# Patient Record
Sex: FEMALE | Race: ASIAN | URBAN - METROPOLITAN AREA
[De-identification: names, ages, dates, MRNs, and addresses within clinical notes are randomized per-mention and may not be internally consistent; named-entity substitution may affect disease eponyms.]

---

## 2017-05-16 ENCOUNTER — PRE VISIT (OUTPATIENT)
Dept: OTOLARYNGOLOGY | Facility: CLINIC | Age: 32
End: 2017-05-16

## 2017-05-16 NOTE — TELEPHONE ENCOUNTER
1.  Date/reason for appt:  5/25/17   Rhinitis    2.  Referring provider:  Self    3.  Call to patient (Yes / No - short description):  Yes, left [facility] for pt to return call.    Where's she been seen/when; what's she had done; any related surgery.    Need email to send CARMELINA (s)

## 2017-05-25 ENCOUNTER — OFFICE VISIT (OUTPATIENT)
Dept: OTOLARYNGOLOGY | Facility: CLINIC | Age: 32
End: 2017-05-25

## 2017-05-25 ENCOUNTER — OFFICE VISIT (OUTPATIENT)
Dept: DERMATOLOGY | Facility: CLINIC | Age: 32
End: 2017-05-25

## 2017-05-25 VITALS — WEIGHT: 128 LBS | BODY MASS INDEX: 23.55 KG/M2 | HEIGHT: 62 IN

## 2017-05-25 DIAGNOSIS — B35.3 TINEA PEDIS, UNSPECIFIED LATERALITY: ICD-10-CM

## 2017-05-25 DIAGNOSIS — B35.1 ONYCHOMYCOSIS: ICD-10-CM

## 2017-05-25 DIAGNOSIS — Z79.899 ENCOUNTER FOR LONG-TERM (CURRENT) USE OF HIGH-RISK MEDICATION: Primary | ICD-10-CM

## 2017-05-25 DIAGNOSIS — J31.0 CHRONIC RHINITIS: Primary | ICD-10-CM

## 2017-05-25 LAB
ALBUMIN SERPL-MCNC: 4.3 G/DL (ref 3.4–5)
ALP SERPL-CCNC: 48 U/L (ref 40–150)
ALT SERPL W P-5'-P-CCNC: 18 U/L (ref 0–50)
AST SERPL W P-5'-P-CCNC: 15 U/L (ref 0–45)
BILIRUB DIRECT SERPL-MCNC: 0.1 MG/DL (ref 0–0.2)
BILIRUB SERPL-MCNC: 0.4 MG/DL (ref 0.2–1.3)
PROT SERPL-MCNC: 7.9 G/DL (ref 6.8–8.8)

## 2017-05-25 RX ORDER — ECONAZOLE NITRATE 10 MG/G
CREAM TOPICAL
Qty: 85 G | Refills: 3 | Status: SHIPPED | OUTPATIENT
Start: 2017-05-25

## 2017-05-25 RX ORDER — FLUTICASONE PROPIONATE 50 MCG
2 SPRAY, SUSPENSION (ML) NASAL DAILY
Qty: 16 G | Refills: 1 | Status: SHIPPED | OUTPATIENT
Start: 2017-05-25 | End: 2017-06-24

## 2017-05-25 RX ORDER — LORATADINE 10 MG/1
10 CAPSULE, LIQUID FILLED ORAL DAILY
Qty: 60 CAPSULE | Refills: 3 | Status: SHIPPED | OUTPATIENT
Start: 2017-05-25 | End: 2017-07-24

## 2017-05-25 ASSESSMENT — PAIN SCALES - GENERAL
PAINLEVEL: NO PAIN (0)
PAINLEVEL: NO PAIN (0)

## 2017-05-25 NOTE — NURSING NOTE
"Dermatology Rooming Note    Bhargavi Ayala's goals for this visit include:   Chief Complaint   Patient presents with     Derm Problem     Bhargavi is here today for concerns of having tinea pedis. States she has had it for \"many years.\"         Cheri Olsen LPN  "

## 2017-05-25 NOTE — LETTER
5/25/2017       RE: Bhargavi Ayala  [address] [facility] [address]     Dear Colleague,    Thank you for referring your patient, Bhargavi Ayala, to the [facility] DERMATOLOGY at [facility]. Please see a copy of my visit note below.    CHIEF COMPLAINT:  Skin changes on feet.      HISTORY OF PRESENT ILLNESS:  Bhargavi is a very pleasant 32-year-old Chinese female with a several-year history of scaling on the feet that she has previously been told is foot fungus.  She has tried some Chinese medicines for this before but has never received a prescription and is not using any over-the-counter antifungals.  She also notes thickening and yellowing of multiple toenails.  She has no similar involvement of her skin at other sites and no involvement of her fingernails.      REVIEW OF SYSTEMS:  No recent fevers or chills.  No nausea, vomiting, diarrhea or abdominal pain.      SOCIAL HISTORY:  She denies consuming alcohol.      PHYSICAL EXAMINATION:   GENERAL:  This is a well-appearing, well-nourished female with a normal mood and affect who is oriented x3.   SKIN:  A cutaneous exam of the head, neck, bilateral upper and lower extremities was performed.  On the bilateral plantar feet, there is fine branny scaling in a moccasin distribution, but no significant erythema or crusting.  Multiple toenails demonstrate yellowing, thickening and general onychodystrophy.      IN-OFFICE EXAM:  KOH exam of plantar foot scale was negative for fungal elements.      ASSESSMENT AND PLAN:  Likely onychomycosis and possible tinea pedis.  While her KOH scraping was negative, her nail involvement is highly suspicious for a dermatophyte infection.  For this reason, we gave her a prescription for econazole 1% cream to be applied twice daily, also we took a nail clipping for PAS staining and performed screening LFTs.  If her nail clipping is positive and LFTs are within normal range, we will have her start terbinafine 250  mg once daily for a total of 90 days.  I counseled her regarding potential side effects including abdominal pain and the rare risks of hepatic toxicity.  She will subsequently follow up in our clinic in approximately 4 months' time.       Abhilash Myers MD  Dermatology Attending

## 2017-05-25 NOTE — PROGRESS NOTES
The patient presents with a history of chronic non-purulent rhinitis with exacerbations in the Spring and when exposed to strong scents. She reports that her symptoms worsen when she lays down in bed. The patient denies sinusitis, facial pain, nasal obstruction or purulent nasal discharge. The patient denies chronic or recurrent tonsillitis, chronic or recurrent pharyngitis. The patient denies otalgia, otorrhea, eustachian tube dysfunction, ear infections, dizziness or tinnitus.     This patient is seen in consultation as a self referral to my clinic.     All other systems were reviewed and they are either negative or they are not directly pertinent to this Otolaryngology examination.      Past Medical History:    Past Medical History:   Diagnosis Date     Allergic rhinitis 2010       Past Surgical History:    No past surgical history on file.    Medications:    No current outpatient prescriptions on file.    Allergies:    Pollen extract    Physical Examination:    The patient is a well developed, well nourished female in no apparent distress.  She is normocephalic, atraumatic with pupils equally round and reactive to light.    Oral Cavity Examination:  Normal mucosa with no masses or lesions  Nasal Examination: Congested nasal turbinates and nasal mucosa with no masses or lesions  Ear Examination: Ear canals clear, tympanic membranes and middle ear spaces normal  Neurological Examination: Facial nerve function intact and symmetric  Integumentary Examination: No lesions on the skin of the head and neck  Neck Examination: No masses or lesions, no lymphadenopathy  Endocrine Examination: Normal thyroid examination    Assessment and Plan:    The patient presents with a history of allergic rhinitis. She will be referred to Dr. Dallas Alvarez for further evaluation and allergy testing. She will be initiated on Flonase Nasal Spray and Claritin and she will stop these medications two weeks prior to her visit with   Dallas lAvarez.

## 2017-05-25 NOTE — LETTER
5/25/2017       RE: Bhargavi Ayala  [address] [facility] [address]     Dear Colleague,    Thank you for referring your patient, Bhargavi Ayala, to the [facility] EAR NOSE AND THROAT at [facility]. Please see a copy of my visit note below.    The patient presents with a history of chronic non-purulent rhinitis with exacerbations in the Spring and when exposed to strong scents. She reports that her symptoms worsen when she lays down in bed. The patient denies sinusitis, facial pain, nasal obstruction or purulent nasal discharge. The patient denies chronic or recurrent tonsillitis, chronic or recurrent pharyngitis. The patient denies otalgia, otorrhea, eustachian tube dysfunction, ear infections, dizziness or tinnitus.     This patient is seen in consultation as a self referral to my clinic.     All other systems were reviewed and they are either negative or they are not directly pertinent to this Otolaryngology examination.      Past Medical History:    Past Medical History:   Diagnosis Date     Allergic rhinitis 2010       Past Surgical History:    No past surgical history on file.    Medications:    No current outpatient prescriptions on file.    Allergies:    Pollen extract    Physical Examination:    The patient is a well developed, well nourished female in no apparent distress.  She is normocephalic, atraumatic with pupils equally round and reactive to light.    Oral Cavity Examination:  Normal mucosa with no masses or lesions  Nasal Examination: Congested nasal turbinates and nasal mucosa with no masses or lesions  Ear Examination: Ear canals clear, tympanic membranes and middle ear spaces normal  Neurological Examination: Facial nerve function intact and symmetric  Integumentary Examination: No lesions on the skin of the head and neck  Neck Examination: No masses or lesions, no lymphadenopathy  Endocrine Examination: Normal thyroid examination    Assessment and Plan:    The  patient presents with a history of allergic rhinitis. She will be referred to Dr. Dallas Alvarez for further evaluation and allergy testing. She will be initiated on Flonase Nasal Spray and Claritin and she will stop these medications two weeks prior to her visit with Dr. Dallas Alvarez.         Again, thank you for allowing me to participate in the care of your patient.      Sincerely,    Inocencio Salinas MD

## 2017-05-25 NOTE — MR AVS SNAPSHOT
After Visit Summary   5/25/2017    Bhargavi Ayala    MRN: [id number]           Patient Information     Date Of Birth          1985        Visit Information        Provider Department      5/25/2017 4:00 PM Inocencio Salinas MD [facility] Ear Nose and Throat        Today's Diagnoses     Chronic rhinitis    -  1      Care Instructions    The patient presents with a history of allergic rhinitis. She will be referred to Dr. Dallas Alvarez for further evaluation and allergy testing. She will be initiated on Flonase Nasal Spray and Claritin and she will stop these medications two weeks prior to her visit with Dr. Dallas Alvarez.           Follow-ups after your visit        Who to contact     Please call your clinic at [phone] to:    Ask questions about your health    Make or cancel appointments    Discuss your medicines    Learn about your test results    Speak to your doctor   If you have compliments or concerns about an experience at your clinic, or if you wish to file a complaint, please contact [facility] Patient Relations at [phone] or email us at [email or web address]         Additional Information About Your Visit        MyChart Information     Visualaset gives you secure access to your electronic health record. If you see a primary care provider, you can also send messages to your care team and make appointments. If you have questions, please call your primary care clinic.  If you do not have a primary care provider, please call [phone] and they will assist you.      Goodpatch is an electronic gateway that provides easy, online access to your medical records. With Goodpatch, you can request a clinic appointment, read your test results, renew a prescription or communicate with your care team.     To access your existing account, please contact your [facility] or call [phone] for assistance.        Care EveryWhere  "ID     This is your Care EveryWhere ID. This could be used by other organizations to access your [place] medical records  [id number]        Your Vitals Were     Height BMI (Body Mass Index)                1.58 m (5' 2.21\") 23.26 kg/m2           Blood Pressure from Last 3 Encounters:   No data found for BP    Weight from Last 3 Encounters:   05/25/17 58.1 kg (128 lb)              We Performed the Following     OFFICE CONSULTATION,LEVEL III          Today's Medication Changes          These changes are accurate as of: 5/25/17  4:16 PM.  If you have any questions, ask your nurse or doctor.               Start taking these medicines.        Dose/Directions    fluticasone 50 MCG/ACT spray   Commonly known as:  FLONASE ALLERGY RELIEF   Used for:  Chronic rhinitis   Started by:  Inocencio Salinas MD        Dose:  2 spray   Spray 2 sprays into both nostrils daily   Quantity:  16 g   Refills:  1       loratadine 10 MG capsule   Commonly known as:  CLARITIN   Used for:  Chronic rhinitis   Started by:  Inocencio Salinas MD        Dose:  10 mg   Take 10 mg by mouth daily   Quantity:  60 capsule   Refills:  3            Where to get your medicines      These medications were sent to [facility] [address] [place] [facility] [address] [place] [address]  [address] [place] [address]     Phone:  [phone]     fluticasone 50 MCG/ACT spray    loratadine 10 MG capsule                Primary Care Provider    No Pcp Confirmed       No address on file        Thank you!     Thank you for choosing [facility] EAR NOSE AND THROAT  for your care. Our goal is always to provide you with excellent care. Hearing back from our patients is one way we can continue to improve our services. Please take a few minutes to complete the written survey that you may receive in the mail after your visit with us. Thank you!             Your Updated Medication List - Protect others around you: Learn how to safely use, store and throw away " your medicines at www.disposemymeds.org.          This list is accurate as of: 5/25/17  4:16 PM.  Always use your most recent med list.                   Brand Name Dispense Instructions for use    fluticasone 50 MCG/ACT spray    FLONASE ALLERGY RELIEF    16 g    Spray 2 sprays into both nostrils daily       loratadine 10 MG capsule    CLARITIN    60 capsule    Take 10 mg by mouth daily

## 2017-05-25 NOTE — MR AVS SNAPSHOT
After Visit Summary   5/25/2017    Bhargavi Ayala    MRN: [id number]           Patient Information     Date Of Birth          1985        Visit Information        Provider Department      5/25/2017 3:00 PM Abhilash Myers MD [facility] Dermatology        Today's Diagnoses     Encounter for long-term (current) use of high-risk medication    -  1    Onychomycosis        Tinea pedis, unspecified laterality           Follow-ups after your visit        Who to contact     Please call your clinic at [phone] to:    Ask questions about your health    Make or cancel appointments    Discuss your medicines    Learn about your test results    Speak to your doctor   If you have compliments or concerns about an experience at your clinic, or if you wish to file a complaint, please contact [facility] Patient Relations at [phone] or email us at [email or web address]         Additional Information About Your Visit        MyChart Information     PubNativet gives you secure access to your electronic health record. If you see a primary care provider, you can also send messages to your care team and make appointments. If you have questions, please call your primary care clinic.  If you do not have a primary care provider, please call [phone] and they will assist you.      Weeve is an electronic gateway that provides easy, online access to your medical records. With Weeve, you can request a clinic appointment, read your test results, renew a prescription or communicate with your care team.     To access your existing account, please contact your [facility] Clinic or call [phone] for assistance.        Care EveryWhere ID     This is your Care EveryWhere ID. This could be used by other organizations to access your [place] medical records  [id number]         Blood Pressure from Last 3 Encounters:   No data found for BP    Weight from Last 3  Encounters:   05/25/17 58.1 kg (128 lb)              We Performed the Following     Hepatic panel     [place] prep (Back Office)     Surgical pathology exam          Today's Medication Changes          These changes are accurate as of: 5/25/17 11:59 PM.  If you have any questions, ask your nurse or doctor.               Start taking these medicines.        Dose/Directions    econazole nitrate 1 % cream   Used for:  Tinea pedis, unspecified laterality   Started by:  Abhilash Myers MD        Apply twice a day to skin on feet   Quantity:  85 g   Refills:  3       fluticasone 50 MCG/ACT spray   Commonly known as:  FLONASE ALLERGY RELIEF   Used for:  Chronic rhinitis   Started by:  Inocencio Salinas MD        Dose:  2 spray   Spray 2 sprays into both nostrils daily   Quantity:  16 g   Refills:  1       loratadine 10 MG capsule   Commonly known as:  CLARITIN   Used for:  Chronic rhinitis   Started by:  Inocencio Salinas MD        Dose:  10 mg   Take 10 mg by mouth daily   Quantity:  60 capsule   Refills:  3       terbinafine 250 MG tablet   Commonly known as:  lamISIL   Used for:  Onychomycosis   Started by:  Abhilash Myers MD        Dose:  250 mg   Take 1 tablet (250 mg) by mouth daily For 90 days   Quantity:  30 tablet   Refills:  2            Where to get your medicines      These medications were sent to [place] [address] [place] [address]  [address] [place] [address]     Phone:  [phone]     econazole nitrate 1 % cream    fluticasone 50 MCG/ACT spray    loratadine 10 MG capsule    terbinafine 250 MG tablet                Primary Care Provider    No Pcp Confirmed       No address on file        Thank you!     Thank you for choosing [facility] DERMATOLOGY  for your care. Our goal is always to provide you with excellent care. Hearing back from our patients is one way we can continue to improve our services. Please take a few minutes to complete the written survey  that you may receive in the mail after your visit with us. Thank you!             Your Updated Medication List - Protect others around you: Learn how to safely use, store and throw away your medicines at www.disposemymeds.org.          This list is accurate as of: 5/25/17 11:59 PM.  Always use your most recent med list.                   Brand Name Dispense Instructions for use    econazole nitrate 1 % cream     85 g    Apply twice a day to skin on feet       fluticasone 50 MCG/ACT spray    FLONASE ALLERGY RELIEF    16 g    Spray 2 sprays into both nostrils daily       loratadine 10 MG capsule    CLARITIN    60 capsule    Take 10 mg by mouth daily       terbinafine 250 MG tablet    lamISIL    30 tablet    Take 1 tablet (250 mg) by mouth daily For 90 days

## 2017-05-25 NOTE — PATIENT INSTRUCTIONS
The patient presents with a history of allergic rhinitis. She will be referred to Dr. Dallas Alvarez for further evaluation and allergy testing. She will be initiated on Flonase Nasal Spray and Claritin and she will stop these medications two weeks prior to her visit with Dr. Dallas Alvarez.

## 2017-05-31 LAB — COPATH REPORT: NORMAL

## 2017-06-01 RX ORDER — TERBINAFINE HYDROCHLORIDE 250 MG/1
250 TABLET ORAL DAILY
Qty: 30 TABLET | Refills: 2 | Status: SHIPPED | OUTPATIENT
Start: 2017-06-01

## 2017-06-01 NOTE — PROGRESS NOTES
CHIEF COMPLAINT:  Skin changes on feet.      HISTORY OF PRESENT ILLNESS:  Bhargavi is a very pleasant 32-year-old Chinese female with a several-year history of scaling on the feet that she has previously been told is foot fungus.  She has tried some Chinese medicines for this before but has never received a prescription and is not using any over-the-counter antifungals.  She also notes thickening and yellowing of multiple toenails.  She has no similar involvement of her skin at other sites and no involvement of her fingernails.      REVIEW OF SYSTEMS:  No recent fevers or chills.  No nausea, vomiting, diarrhea or abdominal pain.      SOCIAL HISTORY:  She denies consuming alcohol.      PHYSICAL EXAMINATION:   GENERAL:  This is a well-appearing, well-nourished female with a normal mood and affect who is oriented x3.   SKIN:  A cutaneous exam of the head, neck, bilateral upper and lower extremities was performed.  On the bilateral plantar feet, there is fine branny scaling in a moccasin distribution, but no significant erythema or crusting.  Multiple toenails demonstrate yellowing, thickening and general onychodystrophy.      IN-OFFICE EXAM:  KOH exam of plantar foot scale was negative for fungal elements.      ASSESSMENT AND PLAN:  Likely onychomycosis and possible tinea pedis.  While her KOH scraping was negative, her nail involvement is highly suspicious for a dermatophyte infection.  For this reason, we gave her a prescription for econazole 1% cream to be applied twice daily, also we took a nail clipping for PAS staining and performed screening LFTs.  If her nail clipping is positive and LFTs are within normal range, we will have her start terbinafine 250 mg once daily for a total of 90 days.  I counseled her regarding potential side effects including abdominal pain and the rare risks of hepatic toxicity.  She will subsequently follow up in our clinic in approximately 4 months' time.       Abhilash Myers,  MD  Dermatology Attending

## 2017-06-13 ENCOUNTER — TRANSFERRED RECORDS (OUTPATIENT)
Dept: HEALTH INFORMATION MANAGEMENT | Facility: CLINIC | Age: 32
End: 2017-06-13

## 2017-06-15 PROBLEM — B35.1 ONYCHOMYCOSIS: Status: ACTIVE | Noted: 2017-06-15

## 2017-09-10 DIAGNOSIS — B35.1 ONYCHOMYCOSIS: ICD-10-CM

## 2017-09-11 NOTE — TELEPHONE ENCOUNTER
Last seen 5/25/17.  No future appt at this time.  (Should have completed 90 day dose)    ASSESSMENT AND PLAN:  Likely onychomycosis and possible tinea pedis.  While her KOH scraping was negative, her nail involvement is highly suspicious for a dermatophyte infection.  For this reason, we gave her a prescription for econazole 1% cream to be applied twice daily, also we took a nail clipping for PAS staining and performed screening LFTs.  If her nail clipping is positive and LFTs are within normal range, we will have her start terbinafine 250 mg once daily for a total of 90 days.  I counseled her regarding potential side effects including abdominal pain and the rare risks of hepatic toxicity.  She will subsequently follow up in our clinic in approximately 4 months' time.

## 2017-09-12 RX ORDER — TERBINAFINE HYDROCHLORIDE 250 MG/1
TABLET ORAL
Qty: 30 TABLET | Refills: 2 | OUTPATIENT
Start: 2017-09-12

## 2017-09-12 NOTE — TELEPHONE ENCOUNTER
Chart reviewed. Patient was only to complete 90 days of terbinafine. She will need to be seen in clinic to assess her onychomycosis prior to additional refills of this medication.    Milton Reyes MD, PhD  Medicine-Dermatology PGY-2

## 2017-09-25 ENCOUNTER — PRE VISIT (OUTPATIENT)
Dept: PULMONOLOGY | Facility: CLINIC | Age: 32
End: 2017-09-25

## 2017-09-25 NOTE — TELEPHONE ENCOUNTER
1.  Date/reason for appt: 10/2/17, Chronic Rhinitis     2.  Referring provider: ELVI JORGENSEN    3.  Call to patient (Yes / No - short description): No, referred     4.  Previous care at / records requested from:   [facility]

## 2018-01-21 ENCOUNTER — HEALTH MAINTENANCE LETTER (OUTPATIENT)
Age: 33
End: 2018-01-21

## 2020-03-10 ENCOUNTER — HEALTH MAINTENANCE LETTER (OUTPATIENT)
Age: 35
End: 2020-03-10

## 2020-12-27 ENCOUNTER — HEALTH MAINTENANCE LETTER (OUTPATIENT)
Age: 35
End: 2020-12-27

## 2021-04-24 ENCOUNTER — HEALTH MAINTENANCE LETTER (OUTPATIENT)
Age: 36
End: 2021-04-24

## 2021-10-09 ENCOUNTER — HEALTH MAINTENANCE LETTER (OUTPATIENT)
Age: 36
End: 2021-10-09

## 2022-05-21 ENCOUNTER — HEALTH MAINTENANCE LETTER (OUTPATIENT)
Age: 37
End: 2022-05-21

## 2022-09-11 ENCOUNTER — HEALTH MAINTENANCE LETTER (OUTPATIENT)
Age: 37
End: 2022-09-11

## 2023-06-03 ENCOUNTER — HEALTH MAINTENANCE LETTER (OUTPATIENT)
Age: 38
End: 2023-06-03